# Patient Record
Sex: MALE | URBAN - METROPOLITAN AREA
[De-identification: names, ages, dates, MRNs, and addresses within clinical notes are randomized per-mention and may not be internally consistent; named-entity substitution may affect disease eponyms.]

---

## 2019-09-27 ENCOUNTER — APPOINTMENT (RX ONLY)
Dept: URBAN - METROPOLITAN AREA CLINIC 57 | Facility: CLINIC | Age: 84
Setting detail: DERMATOLOGY
End: 2019-09-27

## 2019-09-27 DIAGNOSIS — I83029 VARICOSE VEINS OF LOWER EXTREMITIES WITH ULCER: ICD-10-CM | Status: INADEQUATELY CONTROLLED

## 2019-09-27 DIAGNOSIS — I87.2 VENOUS INSUFFICIENCY (CHRONIC) (PERIPHERAL): ICD-10-CM

## 2019-09-27 DIAGNOSIS — I83009 VARICOSE VEINS OF LOWER EXTREMITIES WITH ULCER: ICD-10-CM | Status: INADEQUATELY CONTROLLED

## 2019-09-27 DIAGNOSIS — I83019 VARICOSE VEINS OF LOWER EXTREMITIES WITH ULCER: ICD-10-CM | Status: INADEQUATELY CONTROLLED

## 2019-09-27 PROBLEM — Z85.46 PERSONAL HISTORY OF MALIGNANT NEOPLASM OF PROSTATE: Status: ACTIVE | Noted: 2019-09-27

## 2019-09-27 PROBLEM — I10 ESSENTIAL (PRIMARY) HYPERTENSION: Status: ACTIVE | Noted: 2019-09-27

## 2019-09-27 PROBLEM — I83.028 VARICOSE VEINS OF LEFT LOWER EXTREMITY WITH ULCER OTHER PART OF LOWER LEG: Status: ACTIVE | Noted: 2019-09-27

## 2019-09-27 PROBLEM — I48.91 UNSPECIFIED ATRIAL FIBRILLATION: Status: ACTIVE | Noted: 2019-09-27

## 2019-09-27 PROBLEM — E78.5 HYPERLIPIDEMIA, UNSPECIFIED: Status: ACTIVE | Noted: 2019-09-27

## 2019-09-27 PROBLEM — M12.9 ARTHROPATHY, UNSPECIFIED: Status: ACTIVE | Noted: 2019-09-27

## 2019-09-27 PROCEDURE — ? TREATMENT REGIMEN

## 2019-09-27 PROCEDURE — 99202 OFFICE O/P NEW SF 15 MIN: CPT

## 2019-09-27 PROCEDURE — ? COUNSELING

## 2019-09-27 PROCEDURE — ? PRESCRIPTION

## 2019-09-27 RX ORDER — TRIAMCINOLONE ACETONIDE 0.1 %
OINTMENT (GRAM) TOPICAL BID
Qty: 1 | Refills: 2 | Status: ERX | COMMUNITY
Start: 2019-09-27

## 2019-09-27 RX ORDER — MUPIROCIN 20 MG/G
OINTMENT TOPICAL BID
Qty: 1 | Refills: 2 | Status: ERX | COMMUNITY
Start: 2019-09-27

## 2019-09-27 RX ADMIN — MUPIROCIN: 20 OINTMENT TOPICAL at 16:04

## 2019-09-27 RX ADMIN — Medication: at 16:07

## 2019-09-27 ASSESSMENT — LOCATION DETAILED DESCRIPTION DERM
LOCATION DETAILED: LEFT MEDIAL DISTAL PRETIBIAL REGION
LOCATION DETAILED: LEFT DISTAL PRETIBIAL REGION

## 2019-09-27 ASSESSMENT — LOCATION SIMPLE DESCRIPTION DERM: LOCATION SIMPLE: LEFT PRETIBIAL REGION

## 2019-09-27 ASSESSMENT — LOCATION ZONE DERM: LOCATION ZONE: LEG

## 2019-09-27 NOTE — PROCEDURE: TREATMENT REGIMEN
Detail Level: Zone
Initiate Treatment: TAC 0.1% ointment/Mupirocin 2% ointment BID until resolved.
Plan: Discussed vascular/circulatory issues as a possible cause of symptoms. Recommended pt follow up with PCP.